# Patient Record
Sex: MALE | URBAN - METROPOLITAN AREA
[De-identification: names, ages, dates, MRNs, and addresses within clinical notes are randomized per-mention and may not be internally consistent; named-entity substitution may affect disease eponyms.]

---

## 2024-06-11 ENCOUNTER — OFFICE VISIT (OUTPATIENT)
Dept: ORTHOPEDIC SURGERY | Facility: CLINIC | Age: 54
End: 2024-06-11
Payer: COMMERCIAL

## 2024-06-11 VITALS — WEIGHT: 200 LBS | BODY MASS INDEX: 26.51 KG/M2 | HEIGHT: 73 IN

## 2024-06-11 DIAGNOSIS — M25.511 ACUTE PAIN OF RIGHT SHOULDER: ICD-10-CM

## 2024-06-11 DIAGNOSIS — M75.01 ADHESIVE CAPSULITIS OF RIGHT SHOULDER: ICD-10-CM

## 2024-06-11 PROCEDURE — 99203 OFFICE O/P NEW LOW 30 MIN: CPT | Performed by: ORTHOPAEDIC SURGERY

## 2024-06-11 PROCEDURE — 1036F TOBACCO NON-USER: CPT | Performed by: ORTHOPAEDIC SURGERY

## 2024-06-11 RX ORDER — MELOXICAM 15 MG/1
15 TABLET ORAL DAILY
Qty: 30 TABLET | Refills: 0 | Status: SHIPPED | OUTPATIENT
Start: 2024-06-11 | End: 2024-07-11

## 2024-06-11 NOTE — PROGRESS NOTES
53-year-old is seen with right shoulder pain for the past 8 months.  He desk type job.  He had been seen at the [facility] September 2023 and had a cortisone injection had not had physical therapy 1 visit.  He said progressive increased pain and loss of range of motion especially worse over the last few months.  No particular new traumatic event.    Pleasant and no acute distress.  Left shoulder forward flexion 140°, external rotation 10 degrees, abduction 40 degrees, internal rotation to his side.. No effusion or instability. There is positive Neer and Agee impingement. There is subacromial crepitus and tenderness around the subacromial space.  There is biceps tenderness. There is a positive [place]'s test. No acromioclavicular tenderness. Rotator cuff strength is decreased but there is discomfort with strength testing. Right shoulder forward flexion 180°. No effusion or instability. No impingement or crepitus or tenderness involving the subacromial space. No biceps or acromioclavicular tenderness. There is intact rotator cuff strength. There is adequate range of motion of the cervical spine without pain. Both upper extremities are well perfused skin is intact and muscle tone is adequate. Elbow flexion and extension and wrist flexion and extension strength are intact.    Multiple x-ray views of the left shoulder demonstrate no acute bony normality.    A discussion about adhesive capsulitis was done.  Treatment options were reviewed.  He was prescribed meloxicam.  He will return to physical therapy.  He will have an MRI given his longstanding symptoms despite conservative treatment.  There is potential need for shoulder arthroscopy and this was also discussed.

## 2024-06-17 ENCOUNTER — HOSPITAL ENCOUNTER (OUTPATIENT)
Dept: RADIOLOGY | Facility: EXTERNAL LOCATION | Age: 54
Discharge: HOME | End: 2024-06-17
Payer: COMMERCIAL

## 2024-06-26 ENCOUNTER — APPOINTMENT (OUTPATIENT)
Dept: RADIOLOGY | Facility: HOSPITAL | Age: 54
End: 2024-06-26
Payer: COMMERCIAL

## 2024-06-28 ENCOUNTER — TELEPHONE (OUTPATIENT)
Dept: ORTHOPEDIC SURGERY | Facility: CLINIC | Age: 54
End: 2024-06-28
Payer: COMMERCIAL

## 2024-07-01 NOTE — TELEPHONE ENCOUNTER
Spoke with patient. He is aware MRI is consistent with adhesive capsulitis. He has a referral to PT. Advised if he does not see improvement in 6-8 weeks, to follow-up with Dr. Rocha for discussion of surgical intervention. -HBD    ----- Message from Claus Rocha MD sent at 7/1/2024 11:40 AM EDT -----  Regarding: FW: Mri  Contact: [phone]  Please let him know the MRI demonstrated adhesive capsulitis as we expected.  No other unexpected findings.  He should go to formal physical therapy.  My impression is that he did not do a significant amount of formal PT before.  If he is not improving over the next 6 to 8 weeks then he should return for discussion about surgical treatment options.    Please let me know if he has other questions or issues with this plan  ----- Message -----  From: ANNALEE Griffin-BRENDA  Sent: 7/1/2024  10:16 AM EDT  To: Claus Rocha MD  Subject: FW: Mri                                          I reviewed MRI results. I was going to call patient and explain results are consistent with adhesive capsulitis, like you discussed in the office at his visit. Was going to advise that he bring in disc and follow-up with you for possible discussion of surgical intervention. Do you agree?    ----- Message -----  From: Alisha Tellez  Sent: 7/1/2024  10:07 AM EDT  To: SUSI Griffin  Subject: FW: Mri                                          JSL patient. Can you please review MRI results? Thanks Alisha   ----- Message -----  From: Byron Valverde  Sent: 7/1/2024  10:05 AM EDT  To: [email or web address] Clinical Support Staff  Subject: Mri                                              I saw they sent my results and I am keeping my pt fir tomorrow. I am curious what the results mean and next steps.

## 2024-07-02 ENCOUNTER — EVALUATION (OUTPATIENT)
Dept: PHYSICAL THERAPY | Facility: CLINIC | Age: 54
End: 2024-07-02
Payer: COMMERCIAL

## 2024-07-02 DIAGNOSIS — M75.01 ADHESIVE CAPSULITIS OF RIGHT SHOULDER: ICD-10-CM

## 2024-07-02 PROCEDURE — 97161 PT EVAL LOW COMPLEX 20 MIN: CPT | Mod: GP

## 2024-07-02 PROCEDURE — 97110 THERAPEUTIC EXERCISES: CPT | Mod: GP

## 2024-07-02 PROCEDURE — 97140 MANUAL THERAPY 1/> REGIONS: CPT | Mod: GP

## 2024-07-02 ASSESSMENT — PATIENT HEALTH QUESTIONNAIRE - PHQ9
1. LITTLE INTEREST OR PLEASURE IN DOING THINGS: NOT AT ALL
2. FEELING DOWN, DEPRESSED OR HOPELESS: NOT AT ALL
SUM OF ALL RESPONSES TO PHQ9 QUESTIONS 1 AND 2: 0

## 2024-07-02 NOTE — PROGRESS NOTES
PHYSICAL THERAPY   EVALUATION & TREATMENT NOTE    Patient Name:  Byron Valverde   Patient MRN: [id number]  Date: 07/02/24    Time Calculation  Start Time: 1600  Stop Time: 1645  Time Calculation (min): 45 min    Insurance:  Insurance Type: Medical [place] [employer] [place]  Visit number: 1   Approved # of visits 20   Authorization Needed: no  $50 copay    General   Reason for visit: adhesive capsulitis   Referred by: Dr. Claus Rocha     Therapy diagnoses:   Problem List Items Addressed This Visit    None  Visit Diagnoses         Codes    Adhesive capsulitis of right shoulder     M75.01    Relevant Orders    Follow Up In Physical Therapy            ASSESSMENT     Byron is a 52 yo male who presents with signs and symptoms consistent with adhesive capsulitis on his right shoulder. His exam is significant for decreased shoulder ROM, decreased strength, decreased flexibility, and decreased joint mobility. He presents with difficulty with completing overhead reaching activities, lifting, carrying, dressing, grooming activities. He would benefit from skilled PT services to address the above stated deficits and to restore normal functional mobility. Skilled PT services to improve quality of life and educate regarding home exercise program for self management of symptoms.     Tolerated treatment well with some pain with passive ROM. Pain improved with manual therapy. Able to complete all exercises, which were painful however pain dissipated following completion of exercises. Provided with written hand out of activities.     PLAN   Goals in 8 weeks   1. Pt will be independent in HEP focused on ROM, strength, flexibility for self management of symptoms and to prevent readmission for same condition.   2. Pt will report 0-2/10 right shoulder pain at rest and with activity to be able to complete overhead activities.  3. Pt will demonstrate 5/5 right shoulder flexion strength to return to overhead lifting.  4. Pt will demonstrate right shoulder  flexion ROM to 160 improve overhead reaching.   5. Pt will demonstrate right shoulder ER ROM to 70 to improve overhead reaching.   5. Pt will report QuickDASH score < 20%  to indicate subjective improvement.    Plan of care to include: therapeutic exercise, therapeutic activity, soft tissue mobilization, manual therapy, joint mobilizations, neuromuscular re-education, pt education, self care activities, home program, vaso/cryotherapy, gait training, dry needling, e-stim, modalities PRN    1 x/week for 8 weeks.    Patient agrees to plan of care.    SUBJECTIVE   Reviewed medical history form with patient and medical screening assessed.    Reports that he has had shoulder pain that became progressively worse about a month ago. He reports that all the pain is on the outside of his right shoulder. He reports that sudden movements will aggravate him. He reports that he has very limited ROM. He feels like is ROM is staying about the same right. He does note some numbness / tingling that goes into his elbow that happens about once a week, which does seem to happen at random times.   Insidious onset of symptoms starting with shoulder pain months ago, recently who become worse in the past month with significant loss of ROM and increased pain.     He is right hand dominant.     Pain:    At worst, pain is 10/10  Exacerbating factors include reaching overhead, sudden movements, does   At best, pain is 0/10  Relieving factors include time - has not tried ice / heat     Function:    Sleep not interrupted due to shoulder pain, does sleep on a wedge   Lives in  Baseline function: independent with all daily activities     Work:  - some soreness     Exercise: does walk on regular basis, increased pain with dog leash in right hand    Pt goals: reduce shoulder pain, improved shoulder ROM and strength     Language: English  Potential barriers to treatment: continue to assess    Precautions:    PMH: none   Fall risk -   "none      OBJECTIVE *=painful    Observation/Posture increased shoulder IR, slight right upper trap elevation, increased thoracic kyphosis    Palpation  (+) TTP moderate tenderness throughout long head biceps, upper trap, pec major / minor     Range of Motion (R, L in degrees)   Shoulder   Flexion 90 , 160  Abduction 95, 145   ER 60, 85  IR  greater trochanter, T11    Strength (R, L MMT out of 5)  Shoulder   Flexion 4-, 5  Abduction 4-, 5  ER 4-,  5  IR 4, 5    Outcome Measures  QuickDASH: 45.5%    Today's treatment and initial evaluation included:  - Patient education regarding diagnosis, prognosis, contributing factors, comorbidities, activity modification, symptom monitoring, importance of HEP, role of PT, postural re-education, weight loss, appropriate shoe wear, smoking cessation, work ergonomics, body mechanics, return to sport, reviewed office cancel/no show policy.  - Review of POC & given HEP handout:  [id number]  Therapeutic Exercise  Supine wand flexion 2 x 10 x 10\"  Supine wand ER 2 x 10  x 10\"  Strap IR stretch 3 x 30\"    Manual Therapy   Stm to pec major / minor, long head biceps, upper trap, levator   Grade III/IV posterior  / inferior joint mobilization   PROM flexion, abduction, ER, IR       PT Evaluation Time Entry  PT Evaluation (Low) Time Entry: 15  PT Therapeutic Procedures Time Entry  Manual Therapy Time Entry: 15  Therapeutic Exercise Time Entry: 15                  "

## 2024-07-18 ENCOUNTER — TREATMENT (OUTPATIENT)
Dept: PHYSICAL THERAPY | Facility: CLINIC | Age: 54
End: 2024-07-18
Payer: COMMERCIAL

## 2024-07-18 DIAGNOSIS — M75.01 ADHESIVE CAPSULITIS OF RIGHT SHOULDER: Primary | ICD-10-CM

## 2024-07-18 PROCEDURE — 97110 THERAPEUTIC EXERCISES: CPT | Mod: GP,CQ

## 2024-07-18 PROCEDURE — 97140 MANUAL THERAPY 1/> REGIONS: CPT | Mod: GP,CQ

## 2024-07-18 PROCEDURE — 97035 APP MDLTY 1+ULTRASOUND EA 15: CPT | Mod: GP,CQ

## 2024-07-18 PROCEDURE — 97014 ELECTRIC STIMULATION THERAPY: CPT | Mod: GP,CQ

## 2024-07-18 NOTE — PROGRESS NOTES
PHYSICAL THERAPY   TREATMENT NOTE    Patient Name:  Byron Valverde   Patient MRN: [id number]  Date: 07/19/24    Time Calculation  Start Time: 0450  Stop Time: 0545  Time Calculation (min): 55 min    Insurance:  Insurance Type: Medical [place] [employer] [place]  Visit number: 3   Approved # of visits 20   Authorization Needed: no  $50 copay    General   Reason for visit: adhesive capsulitis   Referred by: Dr. Claus Rocha     Therapy diagnoses:   Problem List Items Addressed This Visit    None  Visit Diagnoses         Codes    Adhesive capsulitis of right shoulder    -  Primary M75.01              ASSESSMENT   Pt c/o soreness during prom and aarom exercises at mid to end range on this date, but able to complete despite discomfort. Added new aarom shoulder flexion, scaption, and ER exercises w/ good tolerance on this date. US completed to warm-up tissues prior to manual and prom. Estim completed following manual to help reduce R shoulder pain and soreness. Pt reports a reduction of pain following modalities.     PLAN: assess shoulder rom and progress at tolerated  Goals in 8 weeks   1. Pt will be independent in HEP focused on ROM, strength, flexibility for self management of symptoms and to prevent readmission for same condition.   2. Pt will report 0-2/10 right shoulder pain at rest and with activity to be able to complete overhead activities.  3. Pt will demonstrate 5/5 right shoulder flexion strength to return to overhead lifting.  4. Pt will demonstrate right shoulder flexion ROM to 160 improve overhead reaching.   5. Pt will demonstrate right shoulder ER ROM to 70 to improve overhead reaching.   5. Pt will report QuickDASH score < 20%  to indicate subjective improvement.      SUBJECTIVE   Pt reports 0/10 pain when at rest, but 9/10 when reaching into painful ranges.     He is right hand dominant.     Pain:    At worst, pain is 10/10  Exacerbating factors include reaching overhead, sudden movements, does   At best, pain is  "0/10  Relieving factors include time - has not tried ice / heat     Function:    Sleep not interrupted due to shoulder pain, does sleep on a wedge   Lives in  Baseline function: independent with all daily activities     Work:  - some soreness     Exercise: does walk on regular basis, increased pain with dog leash in right hand    Pt goals: reduce shoulder pain, improved shoulder ROM and strength     Language: English  Potential barriers to treatment: continue to assess    Precautions:    PMH: none   Fall risk -  none    Therapeutic Exercise: 17 minutes  HEP handout:  [id number], [id number]  Seated pulley flexion x 5 minutes  Swiss ball walkout:  - Flexion: 10 x 10 sec hold  - shoulder scaption 10x 10 sec hold   Seated table ER 10 x 10 sec hold   Strap IR stretch 10 x 5 sec hold   NP:  Supine wand flexion 2 x 10 x 10\"  Supine wand ER 2 x 10  x 10\"    Manual Therapy : 15 minutes  Grade III/IV posterior  / inferior joint mobilization   PROM flexion, abduction, ER, IR   NP:  Stm to pec major / minor, long head biceps, upper trap, levator   Skin intact following    Modalities: 23 minutes  US x 8 minutes, 100%, 1.0hz, int= 1.2, R shoulder  IFC x 15 minutes, 80-150hz @ 40% scan, int = 12, R shoulder  Skin intact following           PT Therapeutic Procedures Time Entry  Manual Therapy Time Entry: 15  Therapeutic Exercise Time Entry: 17  PT Modalities Time Entry  E-Stim (Unattended) Time Entry: 15  Ultrasound Time Entry: 8               "

## 2024-07-29 ENCOUNTER — APPOINTMENT (OUTPATIENT)
Dept: PHYSICAL THERAPY | Facility: CLINIC | Age: 54
End: 2024-07-29
Payer: COMMERCIAL

## 2024-08-08 ENCOUNTER — APPOINTMENT (OUTPATIENT)
Dept: PHYSICAL THERAPY | Facility: CLINIC | Age: 54
End: 2024-08-08
Payer: COMMERCIAL

## 2024-08-12 ENCOUNTER — TREATMENT (OUTPATIENT)
Dept: PHYSICAL THERAPY | Facility: CLINIC | Age: 54
End: 2024-08-12
Payer: COMMERCIAL

## 2024-08-12 DIAGNOSIS — M75.01 ADHESIVE CAPSULITIS OF RIGHT SHOULDER: ICD-10-CM

## 2024-08-12 PROCEDURE — 97014 ELECTRIC STIMULATION THERAPY: CPT | Mod: GP,CQ

## 2024-08-12 PROCEDURE — 97110 THERAPEUTIC EXERCISES: CPT | Mod: GP,CQ

## 2024-08-12 PROCEDURE — 97140 MANUAL THERAPY 1/> REGIONS: CPT | Mod: GP,CQ

## 2024-08-12 NOTE — PROGRESS NOTES
PHYSICAL THERAPY   TREATMENT NOTE    Patient Name:  Byron Valverde   Patient MRN: [id number]  Date: 08/12/24    Time Calculation  Start Time: 0440  Stop Time: 0540  Time Calculation (min): 60 min    Insurance:  Insurance Type: Medical [place] [employer] [place]  Visit number: 3   Approved # of visits 20   Authorization Needed: no  $50 copay    General   Reason for visit: adhesive capsulitis   Referred by: Dr. Claus Rocha     Therapy diagnoses:   Problem List Items Addressed This Visit    None  Visit Diagnoses         Codes    Adhesive capsulitis of right shoulder     M75.01                ASSESSMENT   Progressed shoulder rom to standing wall slides w/ good tolerance. Added TB and gravity resisted exercises on this date w/ complaints of muscle fatigue, but no complaints of increased pain. Pt's R shoulder flexion rom increased to 142 degrees. Pt c/o increased shoulder pain w/ end range prom. Estim applied to help reduce shoulder soreness following treatment session.       PLAN: assess tolerance of new HEP next visit.   Goals in 8 weeks   1. Pt will be independent in HEP focused on ROM, strength, flexibility for self management of symptoms and to prevent readmission for same condition.   2. Pt will report 0-2/10 right shoulder pain at rest and with activity to be able to complete overhead activities.  3. Pt will demonstrate 5/5 right shoulder flexion strength to return to overhead lifting.  4. Pt will demonstrate right shoulder flexion ROM to 160 improve overhead reaching.   5. Pt will demonstrate right shoulder ER ROM to 70 to improve overhead reaching.   5. Pt will report QuickDASH score < 20%  to indicate subjective improvement.      SUBJECTIVE   Pt states that his shoulder is feeling about 85% better. Pt states that his exercises are going well at home. No complaints of pain.    He is right hand dominant.     Pain:    At worst, pain is 10/10  Exacerbating factors include reaching overhead, sudden movements, does   At best, pain is  "0/10  Relieving factors include time - has not tried ice / heat     Function:    Sleep not interrupted due to shoulder pain, does sleep on a wedge   Lives in  Baseline function: independent with all daily activities     Work:  - some soreness     Exercise: does walk on regular basis, increased pain with dog leash in right hand    Pt goals: reduce shoulder pain, improved shoulder ROM and strength     Language: English  Potential barriers to treatment: continue to assess    Precautions:    PMH: none   Fall risk -  none    Therapeutic Exercise:   35 minutes  HEP handout:  [id number], [id number], [id number]  Seated pulley flexion x 5 minutes  Standing wall flexion slides 10 x 10 sec hold   Standing shoulder abduction wall slides 10 x 10 sec hold  Rows 2 x 10, black TB   Shoulder extension 2 x 10, black TB   Shoulder IR 2 x 10, green TB  Shoulder ER 2 x 10, red TB  Supine wand flexion 2 x 10 x 10\"  Supine wand ER 2 x 10  x 10\"  Pec stretch: low and mid 2 x 30 sec hold   Supine shoulder flexion 2 x 10, 1# (pain-free rom)  S/l shoulder abduction 2 x 10 (pain-free rom)  S/l shoulder ER 2 x 10, 2#     Swiss ball walkout:  - Flexion: 10 x 10 sec hold  - shoulder scaption 10x 10 sec hold   Seated table ER 10 x 10 sec hold   Strap IR stretch 10 x 5 sec hold     Manual Therapy :  10 minutes  Grade III/IV posterior  / inferior joint mobilization   PROM flexion, abduction, ER, IR   Skin intact following    Modalities:  15 minutes  US x 8 minutes, 100%, 1.0hz, int= 1.2, R shoulder: np  IFC x 15 minutes, 80-150hz @ 40% scan, int = 12, R shoulder  Skin intact following           PT Therapeutic Procedures Time Entry  Manual Therapy Time Entry: 10  Therapeutic Exercise Time Entry: 35  PT Modalities Time Entry  E-Stim (Unattended) Time Entry: 15               "

## 2024-08-19 ENCOUNTER — APPOINTMENT (OUTPATIENT)
Dept: PHYSICAL THERAPY | Facility: CLINIC | Age: 54
End: 2024-08-19
Payer: COMMERCIAL

## 2024-08-26 ENCOUNTER — TREATMENT (OUTPATIENT)
Dept: PHYSICAL THERAPY | Facility: CLINIC | Age: 54
End: 2024-08-26
Payer: COMMERCIAL

## 2024-08-26 ENCOUNTER — APPOINTMENT (OUTPATIENT)
Dept: PHYSICAL THERAPY | Facility: CLINIC | Age: 54
End: 2024-08-26
Payer: COMMERCIAL

## 2024-08-26 DIAGNOSIS — M75.01 ADHESIVE CAPSULITIS OF RIGHT SHOULDER: ICD-10-CM

## 2024-08-26 PROCEDURE — 97110 THERAPEUTIC EXERCISES: CPT | Mod: GP

## 2024-08-26 PROCEDURE — 97140 MANUAL THERAPY 1/> REGIONS: CPT | Mod: GP

## 2024-08-26 NOTE — PROGRESS NOTES
PHYSICAL THERAPY TREATMENT NOTE    Patient Name:  Byron Valverde   Patient MRN: [id number]  Date: 08/26/24  Time Calculation  Start Time: 0430  Stop Time: 0510  Time Calculation (min): 40 min      Insurance:  Visit number: 4 of 20  Insurance Type: Medical [employer]  Authorization or Plan of Care date Range: 7/2/25    Therapy diagnoses:   1. Adhesive capsulitis of right shoulder  Follow Up In Physical Therapy           General:  Reason for visit: R shoulder pain      Assessment:  Patient is a 54 year old with a diagnosis of adhesive capsulitis of R shoulder   BASELINES: decreased strength and ROM R shoulder, pain   TREATMENT STRATEGIES: UE strength and ROM, modalities as needed  RESPONSE TO TX: Pt tolerated Tx well. Responded well to verbal and visual cues.     Plan:  Continue with UE strengthening, compound movements     Precautions:  N/A  Fall Risk: none    Subjective:   Patient reports that he feels good coming in today. Reports that he feels he has improved a lot since starting therapy. Reports that this is the best he has felt in a long time. Feels fully functional but would still like to reduce pain with movement and stiffness.     Pain (0-10): 0   HEP adherence / understanding: going well, no concerns.     Objective:  ROM  R shoulder flexion: 155  L shoulder flexion: 165  R shoulder abduction: 170  L shoulder abduction: 170  R shoulder IR: T10  L shoulder IR: T4  R shoulder ER: C7  L shoulder ER: T2      Treatment Performed: * indicates new exercises for HEP     Therapeutic Exercise:  - rows x10, black TB  - shoulder extensions x10, black TB  - shoulder IR/ER, black TB x10 ea  - knee push ups 2x10*  - bent over rows 6# 2x10 on R*  - shoulder horizontal abduction flys 6# 2x10 B*  - kneeling lat stretch with cane 2x10 with 5s hold*      Manual:  - PROM shoulder flexion, abd, ER  - circumduction

## 2024-08-28 ENCOUNTER — APPOINTMENT (OUTPATIENT)
Dept: PHYSICAL THERAPY | Facility: CLINIC | Age: 54
End: 2024-08-28
Payer: COMMERCIAL

## 2024-09-04 ENCOUNTER — APPOINTMENT (OUTPATIENT)
Dept: PHYSICAL THERAPY | Facility: CLINIC | Age: 54
End: 2024-09-04
Payer: COMMERCIAL

## 2024-09-26 ENCOUNTER — HOSPITAL ENCOUNTER (OUTPATIENT)
Dept: RADIOLOGY | Facility: CLINIC | Age: 54
Discharge: HOME | End: 2024-09-26
Payer: COMMERCIAL

## 2024-09-26 DIAGNOSIS — Z13.6 ENCOUNTER FOR SCREENING FOR CARDIOVASCULAR DISORDERS: ICD-10-CM

## 2024-09-26 PROCEDURE — 75571 CT HRT W/O DYE W/CA TEST: CPT
